# Patient Record
Sex: MALE | Race: WHITE | NOT HISPANIC OR LATINO | URBAN - METROPOLITAN AREA
[De-identification: names, ages, dates, MRNs, and addresses within clinical notes are randomized per-mention and may not be internally consistent; named-entity substitution may affect disease eponyms.]

---

## 2019-05-05 ENCOUNTER — INPATIENT (INPATIENT)
Facility: HOSPITAL | Age: 64
LOS: 1 days | Discharge: HOME | DRG: 505 | End: 2019-05-07
Attending: INTERNAL MEDICINE | Admitting: INTERNAL MEDICINE
Payer: COMMERCIAL

## 2019-05-05 VITALS
RESPIRATION RATE: 16 BRPM | TEMPERATURE: 98 F | WEIGHT: 235.01 LBS | DIASTOLIC BLOOD PRESSURE: 92 MMHG | HEIGHT: 72 IN | HEART RATE: 60 BPM | OXYGEN SATURATION: 97 % | SYSTOLIC BLOOD PRESSURE: 179 MMHG

## 2019-05-05 DIAGNOSIS — Z98.890 OTHER SPECIFIED POSTPROCEDURAL STATES: Chronic | ICD-10-CM

## 2019-05-05 DIAGNOSIS — Z98.1 ARTHRODESIS STATUS: Chronic | ICD-10-CM

## 2019-05-05 DIAGNOSIS — M86.432: ICD-10-CM

## 2019-05-05 DIAGNOSIS — M86.9 OSTEOMYELITIS, UNSPECIFIED: ICD-10-CM

## 2019-05-05 LAB
ALBUMIN SERPL ELPH-MCNC: 4 G/DL — SIGNIFICANT CHANGE UP (ref 3.3–5)
ALP SERPL-CCNC: 87 U/L — SIGNIFICANT CHANGE UP (ref 30–120)
ALT FLD-CCNC: 27 U/L DA — SIGNIFICANT CHANGE UP (ref 10–60)
ANION GAP SERPL CALC-SCNC: 10 MMOL/L — SIGNIFICANT CHANGE UP (ref 5–17)
APPEARANCE UR: CLEAR — SIGNIFICANT CHANGE UP
APTT BLD: 33.9 SEC — SIGNIFICANT CHANGE UP (ref 28.5–37)
AST SERPL-CCNC: 24 U/L — SIGNIFICANT CHANGE UP (ref 10–40)
BASOPHILS # BLD AUTO: 0.07 K/UL — SIGNIFICANT CHANGE UP (ref 0–0.2)
BASOPHILS NFR BLD AUTO: 0.8 % — SIGNIFICANT CHANGE UP (ref 0–2)
BILIRUB SERPL-MCNC: 0.5 MG/DL — SIGNIFICANT CHANGE UP (ref 0.2–1.2)
BILIRUB UR-MCNC: NEGATIVE — SIGNIFICANT CHANGE UP
BUN SERPL-MCNC: 22 MG/DL — SIGNIFICANT CHANGE UP (ref 7–23)
CALCIUM SERPL-MCNC: 9 MG/DL — SIGNIFICANT CHANGE UP (ref 8.4–10.5)
CHLORIDE SERPL-SCNC: 103 MMOL/L — SIGNIFICANT CHANGE UP (ref 96–108)
CO2 SERPL-SCNC: 27 MMOL/L — SIGNIFICANT CHANGE UP (ref 22–31)
COLOR SPEC: YELLOW — SIGNIFICANT CHANGE UP
CREAT SERPL-MCNC: 0.82 MG/DL — SIGNIFICANT CHANGE UP (ref 0.5–1.3)
DIFF PNL FLD: ABNORMAL
EOSINOPHIL # BLD AUTO: 0.09 K/UL — SIGNIFICANT CHANGE UP (ref 0–0.5)
EOSINOPHIL NFR BLD AUTO: 1 % — SIGNIFICANT CHANGE UP (ref 0–6)
ERYTHROCYTE [SEDIMENTATION RATE] IN BLOOD: 8 MM/HR — SIGNIFICANT CHANGE UP (ref 0–9)
GLUCOSE SERPL-MCNC: 96 MG/DL — SIGNIFICANT CHANGE UP (ref 70–99)
GLUCOSE UR QL: NEGATIVE MG/DL — SIGNIFICANT CHANGE UP
HCT VFR BLD CALC: 43.2 % — SIGNIFICANT CHANGE UP (ref 39–50)
HGB BLD-MCNC: 14.8 G/DL — SIGNIFICANT CHANGE UP (ref 13–17)
IMM GRANULOCYTES NFR BLD AUTO: 0.3 % — SIGNIFICANT CHANGE UP (ref 0–1.5)
INR BLD: 1.39 RATIO — HIGH (ref 0.88–1.16)
KETONES UR-MCNC: NEGATIVE — SIGNIFICANT CHANGE UP
LACTATE SERPL-SCNC: 0.8 MMOL/L — SIGNIFICANT CHANGE UP (ref 0.7–2)
LEUKOCYTE ESTERASE UR-ACNC: NEGATIVE — SIGNIFICANT CHANGE UP
LYMPHOCYTES # BLD AUTO: 1.45 K/UL — SIGNIFICANT CHANGE UP (ref 1–3.3)
LYMPHOCYTES # BLD AUTO: 15.8 % — SIGNIFICANT CHANGE UP (ref 13–44)
MCHC RBC-ENTMCNC: 30 PG — SIGNIFICANT CHANGE UP (ref 27–34)
MCHC RBC-ENTMCNC: 34.3 GM/DL — SIGNIFICANT CHANGE UP (ref 32–36)
MCV RBC AUTO: 87.6 FL — SIGNIFICANT CHANGE UP (ref 80–100)
MONOCYTES # BLD AUTO: 0.75 K/UL — SIGNIFICANT CHANGE UP (ref 0–0.9)
MONOCYTES NFR BLD AUTO: 8.2 % — SIGNIFICANT CHANGE UP (ref 2–14)
NEUTROPHILS # BLD AUTO: 6.77 K/UL — SIGNIFICANT CHANGE UP (ref 1.8–7.4)
NEUTROPHILS NFR BLD AUTO: 73.9 % — SIGNIFICANT CHANGE UP (ref 43–77)
NITRITE UR-MCNC: NEGATIVE — SIGNIFICANT CHANGE UP
NRBC # BLD: 0 /100 WBCS — SIGNIFICANT CHANGE UP (ref 0–0)
PH UR: 6.5 — SIGNIFICANT CHANGE UP (ref 5–8)
PLATELET # BLD AUTO: 306 K/UL — SIGNIFICANT CHANGE UP (ref 150–400)
POTASSIUM SERPL-MCNC: 3.8 MMOL/L — SIGNIFICANT CHANGE UP (ref 3.5–5.3)
POTASSIUM SERPL-SCNC: 3.8 MMOL/L — SIGNIFICANT CHANGE UP (ref 3.5–5.3)
PROT SERPL-MCNC: 7.6 G/DL — SIGNIFICANT CHANGE UP (ref 6–8.3)
PROT UR-MCNC: NEGATIVE MG/DL — SIGNIFICANT CHANGE UP
PROTHROM AB SERPL-ACNC: 15.3 SEC — HIGH (ref 10–12.9)
RBC # BLD: 4.93 M/UL — SIGNIFICANT CHANGE UP (ref 4.2–5.8)
RBC # FLD: 12.4 % — SIGNIFICANT CHANGE UP (ref 10.3–14.5)
RBC CASTS # UR COMP ASSIST: SIGNIFICANT CHANGE UP /HPF (ref 0–4)
SODIUM SERPL-SCNC: 140 MMOL/L — SIGNIFICANT CHANGE UP (ref 135–145)
SP GR SPEC: 1.01 — SIGNIFICANT CHANGE UP (ref 1.01–1.02)
UROBILINOGEN FLD QL: NEGATIVE MG/DL — SIGNIFICANT CHANGE UP
WBC # BLD: 9.16 K/UL — SIGNIFICANT CHANGE UP (ref 3.8–10.5)
WBC # FLD AUTO: 9.16 K/UL — SIGNIFICANT CHANGE UP (ref 3.8–10.5)
WBC UR QL: SIGNIFICANT CHANGE UP

## 2019-05-05 PROCEDURE — 71045 X-RAY EXAM CHEST 1 VIEW: CPT | Mod: 26

## 2019-05-05 PROCEDURE — 99223 1ST HOSP IP/OBS HIGH 75: CPT | Mod: AI

## 2019-05-05 PROCEDURE — 99285 EMERGENCY DEPT VISIT HI MDM: CPT

## 2019-05-05 PROCEDURE — 93010 ELECTROCARDIOGRAM REPORT: CPT

## 2019-05-05 PROCEDURE — 73630 X-RAY EXAM OF FOOT: CPT | Mod: 26,LT

## 2019-05-05 RX ORDER — METOPROLOL TARTRATE 50 MG
25 TABLET ORAL DAILY
Qty: 0 | Refills: 0 | Status: DISCONTINUED | OUTPATIENT
Start: 2019-05-05 | End: 2019-05-07

## 2019-05-05 RX ORDER — ATORVASTATIN CALCIUM 80 MG/1
1 TABLET, FILM COATED ORAL
Qty: 0 | Refills: 0 | COMMUNITY

## 2019-05-05 RX ORDER — SODIUM CHLORIDE 9 MG/ML
1000 INJECTION INTRAMUSCULAR; INTRAVENOUS; SUBCUTANEOUS
Qty: 0 | Refills: 0 | Status: DISCONTINUED | OUTPATIENT
Start: 2019-05-05 | End: 2019-05-07

## 2019-05-05 RX ORDER — PIPERACILLIN AND TAZOBACTAM 4; .5 G/20ML; G/20ML
3.38 INJECTION, POWDER, LYOPHILIZED, FOR SOLUTION INTRAVENOUS EVERY 8 HOURS
Qty: 0 | Refills: 0 | Status: DISCONTINUED | OUTPATIENT
Start: 2019-05-05 | End: 2019-05-06

## 2019-05-05 RX ORDER — LOSARTAN POTASSIUM 100 MG/1
100 TABLET, FILM COATED ORAL DAILY
Qty: 0 | Refills: 0 | Status: DISCONTINUED | OUTPATIENT
Start: 2019-05-05 | End: 2019-05-07

## 2019-05-05 RX ORDER — ENOXAPARIN SODIUM 100 MG/ML
40 INJECTION SUBCUTANEOUS DAILY
Qty: 0 | Refills: 0 | Status: DISCONTINUED | OUTPATIENT
Start: 2019-05-05 | End: 2019-05-07

## 2019-05-05 RX ORDER — PIPERACILLIN AND TAZOBACTAM 4; .5 G/20ML; G/20ML
3.38 INJECTION, POWDER, LYOPHILIZED, FOR SOLUTION INTRAVENOUS ONCE
Qty: 0 | Refills: 0 | Status: COMPLETED | OUTPATIENT
Start: 2019-05-05 | End: 2019-05-05

## 2019-05-05 RX ORDER — VANCOMYCIN HCL 1 G
1750 VIAL (EA) INTRAVENOUS EVERY 12 HOURS
Qty: 0 | Refills: 0 | Status: DISCONTINUED | OUTPATIENT
Start: 2019-05-05 | End: 2019-05-05

## 2019-05-05 RX ORDER — VANCOMYCIN HCL 1 G
1000 VIAL (EA) INTRAVENOUS ONCE
Qty: 0 | Refills: 0 | Status: COMPLETED | OUTPATIENT
Start: 2019-05-05 | End: 2019-05-05

## 2019-05-05 RX ORDER — LOSARTAN POTASSIUM 100 MG/1
1 TABLET, FILM COATED ORAL
Qty: 0 | Refills: 0 | COMMUNITY

## 2019-05-05 RX ORDER — VANCOMYCIN HCL 1 G
1500 VIAL (EA) INTRAVENOUS EVERY 12 HOURS
Qty: 0 | Refills: 0 | Status: DISCONTINUED | OUTPATIENT
Start: 2019-05-05 | End: 2019-05-06

## 2019-05-05 RX ORDER — ATORVASTATIN CALCIUM 80 MG/1
20 TABLET, FILM COATED ORAL AT BEDTIME
Qty: 0 | Refills: 0 | Status: DISCONTINUED | OUTPATIENT
Start: 2019-05-05 | End: 2019-05-07

## 2019-05-05 RX ORDER — METOPROLOL TARTRATE 50 MG
1 TABLET ORAL
Qty: 0 | Refills: 0 | COMMUNITY

## 2019-05-05 RX ORDER — HYDROCHLOROTHIAZIDE 25 MG
12.5 TABLET ORAL DAILY
Qty: 0 | Refills: 0 | Status: DISCONTINUED | OUTPATIENT
Start: 2019-05-05 | End: 2019-05-06

## 2019-05-05 RX ADMIN — SODIUM CHLORIDE 100 MILLILITER(S): 9 INJECTION INTRAMUSCULAR; INTRAVENOUS; SUBCUTANEOUS at 18:00

## 2019-05-05 RX ADMIN — PIPERACILLIN AND TAZOBACTAM 200 GRAM(S): 4; .5 INJECTION, POWDER, LYOPHILIZED, FOR SOLUTION INTRAVENOUS at 17:30

## 2019-05-05 RX ADMIN — SODIUM CHLORIDE 100 MILLILITER(S): 9 INJECTION INTRAMUSCULAR; INTRAVENOUS; SUBCUTANEOUS at 16:58

## 2019-05-05 RX ADMIN — ATORVASTATIN CALCIUM 20 MILLIGRAM(S): 80 TABLET, FILM COATED ORAL at 21:59

## 2019-05-05 RX ADMIN — Medication 250 MILLIGRAM(S): at 18:00

## 2019-05-05 NOTE — H&P ADULT - NSICDXPASTSURGICALHX_GEN_ALL_CORE_FT
PAST SURGICAL HISTORY:  H/O right knee surgery     History of ankle surgery     S/P cervical spinal fusion     S/P lumbar fusion

## 2019-05-05 NOTE — H&P ADULT - ASSESSMENT
Left 3rd toe osteomyelitis  Admit to medicine.  blood cultures.  IV zosyn/vanco  vanco trough level  podiatry . notified.  ID consult . notified by .  likely for OR for amputation on 5/7/19.    HTN  losartan/metoprolo/HCTZ with parameter.    Dyslipidemia  simvastatin.    obesity  weigh reduction.  Plan of care was discuss with patient, all questions were answered, seems understand and in agreement.  case d/w . 62 y/o male with PMHx of HTN, dyslipidemia, s/p cervical and lumbar  fusion, left ankle right knee and shoulder surgery  presents to the ED c/o worsening L third toe infection with discoloration/blackening of toe since few weeks. MRI 3 days ago at showed osteomyelitis. Pt sent by podiatrist Dr. Hewitt for IV abx. Plan for amputation on 5/7.  Nonsmoker. Denies fever, chills, headache, dizziness, chest pain.    Left 3rd toe osteomyelitis  Admit to medicine.  blood cultures.  IV zosyn/vanco  vanco trough level  podiatry . notified.  ID consult . notified by .  likely for OR for amputation on 5/7/19.    HTN  losartan/metoprolo/HCTZ with parameter.    Dyslipidemia  simvastatin.    obesity  weigh reduction.  Plan of care was discuss with patient, all questions were answered, seems understand and in agreement.  case d/w .

## 2019-05-05 NOTE — ED ADULT TRIAGE NOTE - CHIEF COMPLAINT QUOTE
" I have infection my middle toe ( left foot ). Im on antibiotic but it is not working" Pt sent in by Dr Ruelas

## 2019-05-05 NOTE — H&P ADULT - NSHPSOCIALHISTORY_GEN_ALL_CORE
SOCIAL HISTORY:  Smoker:  NO        PACK YEARS:                         WHEN QUIT?  ETOH use:   NO               FREQUENCY / QUANTITY:  [place] Drug use:  NO

## 2019-05-05 NOTE — CONSULT NOTE ADULT - SUBJECTIVE AND OBJECTIVE BOX
S : 63y year old Male seen at bedside for Left foot ulceration with MRI confirmed OM to the middle phalanx.  Patient relates to having the ulceration for  many weeks with increasing severity.  Patient has had previous treatments consisting of oral ABX and local wound care without resolution of his symtoms.    Patient states he was referred to the ED by myself for admission with administration of IV ABX and probable amputation of the left third digit.     Chief Complaint : Patient is a 63y old  Male who presents with a chief complaint of   HPI : HPI:      Patient admits to  (-) Fevers, (-) Chills, (-) Nausea, (-) Vomiting, (-) Shortness of Breath      PMH:   PSH:    Allergies:No Known Allergies      Labs:      WBC Trend      Chem              T(F): 97.6 (05-05-19 @ 15:45), Max: 97.6 (05-05-19 @ 15:45)  HR: 60 (05-05-19 @ 15:45) (60 - 60)  BP: 179/92 (05-05-19 @ 15:45) (179/92 - 179/92)  RR: 16 (05-05-19 @ 15:45) (16 - 16)  SpO2: 97% (05-05-19 @ 15:45) (97% - 97%)  Wt(kg): --    O:   General: Pleasant  male NAD & AOX3.    Integument:  Skin warm, dry and supple bilateral.    Ulceration left  third digit , + hyperkeratotic border, wound base Granular , wound size (1.5 cm X 1.5 cm X.5cm) + edema, + claudy-wound erythema, - purulence, + fluctuance, + tracking/tunneling, + probes to bone.   Vascular: Dorsalis Pedis and Posterior Tibial pulses 2/4.  Capillary re-fill time less then 1 seconds digits 1-5 bilateral.    Neuro: Protective sensation intact to the level of the digits bilateral.  MSK: Muscle strength 5/5 all major muscle groups bilateral.  Deformity:  A: Left foot ulceration with OM confirmed by MRI      P:   Chart reviewed and Patient evaluated  Discussed diagnosis and treatment with patient and patient's wife  Wound flush with normal saline  Obtained wound culture to be sent to Pathology  Excisional debridement  on non viable soft  tissue   down to subcutaneous tissue red granular base  Left foot ulceration  Applied  dry sterile dressing  X-rays reviewed : Shows positive signs of OM distal and middle phalanx  MRI: reviewed and confirmed OM left third digit distal and middle phalanx  Continue with IV antibiotics As Per ID  Weight bearing as tolerated with surgical shoe left   Offloading to bilateral Heels.   Discussed importance of daily foot examinations and proper shoe gear   Podiatry will follow while in house.   will discuss care plan  with all  Attendings

## 2019-05-05 NOTE — ED PROVIDER NOTE - SKIN, MLM
+L 3rd toe with black eschar on tip. all toenails severely infected with fungus. distal. pulses and sensation intact.

## 2019-05-05 NOTE — H&P ADULT - NSICDXPASTMEDICALHX_GEN_ALL_CORE_FT
PAST MEDICAL HISTORY:  HTN (hypertension) PAST MEDICAL HISTORY:  Dyslipidemia     HTN (hypertension)

## 2019-05-05 NOTE — H&P ADULT - HISTORY OF PRESENT ILLNESS
62 y/o male with PMHx of HTN, dyslipidemia, s/p cervical and lumbar  fusion, left ankle right knee and shoulder surgery  presents to the ED c/o worsening L third toe infection with discoloration/blackening of toe since few weeks. MRI 3 days ago at showed osteomyelitis. Pt sent by podiatrist Dr. Hewitt for IV abx. Plan for amputation on 5/7.  Nonsmoker. Denies fever, chills, headache, dizziness, chest pain. 62 y/o male with PMHx of HTN, dyslipidemia, s/p cervical and lumbar  fusion, left ankle right knee and shoulder surgery  presents to the ED c/o worsening L third toe infection with discoloration/blackening of toe since few weeks. MRI 3 days ago  showed osteomyelitis. Pt sent by podiatrist Dr. Hewitt for IV abx. Plan for amputation on 5/7.  Nonsmoker. Denies fever, chills, headache, dizziness, chest pain.

## 2019-05-05 NOTE — ED PROVIDER NOTE - CARDIAC, MLM
Normal rate, regular rhythm.  Heart sounds S1, S2.  No murmurs, rubs or gallops. bradycardic, regular rhythm.  Heart sounds S1, S2.  No murmurs, rubs or gallops.

## 2019-05-05 NOTE — CONSULT NOTE ADULT - PROBLEM SELECTOR RECOMMENDATION 9
local wound care left third digit  IV ABX as per ID  ID consult ordered  xrays ordered  MRI not required - done on the outside confirming OM left third digit  Pt will be scheduled for amputation of the left third digit for Tuesday May 7  hospitalist to medically optimize pt for planned surgical procedures  will follow and discuss with all attendings

## 2019-05-05 NOTE — ED PROVIDER NOTE - OBJECTIVE STATEMENT
62 y/o male with PMHx of HTN, s/p cervical fusion presents to the ED c/o worsening L third toe infection with discoloration/blackening of toe. MRI 3 days ago at showed osteomyelitis. Pt sent by podiatrist Dr. Hewitt for IV abx. Plan for amputation next week. PCP/Dr. Bassett. Nonsmoker

## 2019-05-06 ENCOUNTER — TRANSCRIPTION ENCOUNTER (OUTPATIENT)
Age: 64
End: 2019-05-06

## 2019-05-06 DIAGNOSIS — M86.472 CHRONIC OSTEOMYELITIS WITH DRAINING SINUS, LEFT ANKLE AND FOOT: ICD-10-CM

## 2019-05-06 LAB
HCV AB S/CO SERPL IA: 0.12 S/CO — SIGNIFICANT CHANGE UP (ref 0–0.99)
HCV AB SERPL-IMP: SIGNIFICANT CHANGE UP

## 2019-05-06 PROCEDURE — 93010 ELECTROCARDIOGRAM REPORT: CPT

## 2019-05-06 PROCEDURE — 99233 SBSQ HOSP IP/OBS HIGH 50: CPT

## 2019-05-06 RX ORDER — HYDROCHLOROTHIAZIDE 25 MG
12.5 TABLET ORAL ONCE
Qty: 0 | Refills: 0 | Status: COMPLETED | OUTPATIENT
Start: 2019-05-06 | End: 2019-05-06

## 2019-05-06 RX ORDER — LANOLIN ALCOHOL/MO/W.PET/CERES
3 CREAM (GRAM) TOPICAL AT BEDTIME
Qty: 0 | Refills: 0 | Status: DISCONTINUED | OUTPATIENT
Start: 2019-05-06 | End: 2019-05-07

## 2019-05-06 RX ORDER — ALPRAZOLAM 0.25 MG
0.25 TABLET ORAL ONCE
Qty: 0 | Refills: 0 | Status: DISCONTINUED | OUTPATIENT
Start: 2019-05-06 | End: 2019-05-06

## 2019-05-06 RX ORDER — HYDROCHLOROTHIAZIDE 25 MG
25 TABLET ORAL DAILY
Qty: 0 | Refills: 0 | Status: DISCONTINUED | OUTPATIENT
Start: 2019-05-07 | End: 2019-05-07

## 2019-05-06 RX ADMIN — Medication 0.25 MILLIGRAM(S): at 02:46

## 2019-05-06 RX ADMIN — SODIUM CHLORIDE 100 MILLILITER(S): 9 INJECTION INTRAMUSCULAR; INTRAVENOUS; SUBCUTANEOUS at 12:48

## 2019-05-06 RX ADMIN — Medication 12.5 MILLIGRAM(S): at 05:41

## 2019-05-06 RX ADMIN — PIPERACILLIN AND TAZOBACTAM 25 GRAM(S): 4; .5 INJECTION, POWDER, LYOPHILIZED, FOR SOLUTION INTRAVENOUS at 00:07

## 2019-05-06 RX ADMIN — Medication 300 MILLIGRAM(S): at 05:41

## 2019-05-06 RX ADMIN — ATORVASTATIN CALCIUM 20 MILLIGRAM(S): 80 TABLET, FILM COATED ORAL at 21:28

## 2019-05-06 RX ADMIN — Medication 3 MILLIGRAM(S): at 21:28

## 2019-05-06 RX ADMIN — ENOXAPARIN SODIUM 40 MILLIGRAM(S): 100 INJECTION SUBCUTANEOUS at 11:32

## 2019-05-06 RX ADMIN — LOSARTAN POTASSIUM 100 MILLIGRAM(S): 100 TABLET, FILM COATED ORAL at 05:41

## 2019-05-06 RX ADMIN — PIPERACILLIN AND TAZOBACTAM 25 GRAM(S): 4; .5 INJECTION, POWDER, LYOPHILIZED, FOR SOLUTION INTRAVENOUS at 09:15

## 2019-05-06 RX ADMIN — Medication 12.5 MILLIGRAM(S): at 13:00

## 2019-05-06 RX ADMIN — SODIUM CHLORIDE 100 MILLILITER(S): 9 INJECTION INTRAMUSCULAR; INTRAVENOUS; SUBCUTANEOUS at 00:07

## 2019-05-06 NOTE — PROGRESS NOTE ADULT - SUBJECTIVE AND OBJECTIVE BOX
Pt seen and examined. Reports that his toe swelling and pain has been improving since admission. Denies fevers, chills, or any other symptoms on ROS.     MEDICATIONS  (STANDING):  atorvastatin 20 milliGRAM(s) Oral at bedtime  enoxaparin Injectable 40 milliGRAM(s) SubCutaneous daily  hydrochlorothiazide 12.5 milliGRAM(s) Oral once  losartan 100 milliGRAM(s) Oral daily  metoprolol succinate ER 25 milliGRAM(s) Oral daily  piperacillin/tazobactam IVPB. 3.375 Gram(s) IV Intermittent every 8 hours  sodium chloride 0.9%. 1000 milliLiter(s) (100 mL/Hr) IV Continuous <Continuous>  vancomycin  IVPB 1500 milliGRAM(s) IV Intermittent every 12 hours    MEDICATIONS  (PRN):    Allergies    No Known Allergies    Intolerances    REVIEW OF SYSTEMS:  CONSTITUTIONAL: No fever, weight loss, or fatigue  EYES: No eye pain, visual disturbances, or discharge  ENMT:  No difficulty hearing, tinnitus, vertigo; No sinus or throat pain  NECK: No pain or stiffness  BREASTS: No pain, masses, or nipple discharge  RESPIRATORY: No cough, wheezing, chills or hemoptysis; No shortness of breath  CARDIOVASCULAR: No chest pain, palpitations, dizziness, or leg swelling  GASTROINTESTINAL: No abdominal or epigastric pain. No nausea, vomiting, or hematemesis; No diarrhea or constipation. No melena or hematochezia.  GENITOURINARY: No dysuria, frequency, hematuria, or incontinence  NEUROLOGICAL: No headaches, memory loss, loss of strength, numbness, or tremors  SKIN: No itching, burning, rashes, or lesions   LYMPH NODES: No enlarged glands  ENDOCRINE: No heat or cold intolerance; No hair loss; No polydipsia or polyuria  MUSCULOSKELETAL: L 3rd toe swelling and eschar and pain.   PSYCHIATRIC: No depression, anxiety, mood swings, or difficulty sleeping  HEME/LYMPH: No easy bruising, or bleeding gums  ALLERGY AND IMMUNOLOGIC: No hives or eczema    Vital Signs Last 24 Hrs  T(C): 36.5 (06 May 2019 08:55), Max: 36.6 (05 May 2019 18:17)  T(F): 97.7 (06 May 2019 08:55), Max: 97.9 (06 May 2019 00:25)  HR: 61 (06 May 2019 08:55) (46 - 62)  BP: 164/90 (06 May 2019 08:55) (127/81 - 179/92)  BP(mean): --  RR: 14 (06 May 2019 08:55) (14 - 17)  SpO2: 98% (06 May 2019 08:55) (96% - 98%)        PHYSICAL EXAM:  Vital Signs Last 24 Hrs  T(C): 36.5 (06 May 2019 08:55), Max: 36.6 (05 May 2019 18:17)  T(F): 97.7 (06 May 2019 08:55), Max: 97.9 (06 May 2019 00:25)  HR: 61 (06 May 2019 08:55) (46 - 62)  BP: 164/90 (06 May 2019 08:55) (127/81 - 179/92)  BP(mean): --  RR: 14 (06 May 2019 08:55) (14 - 17)  SpO2: 98% (06 May 2019 08:55) (96% - 98%)    GENERAL: NAD, well-groomed, well-developed  HEAD:  Atraumatic, Normocephalic  EYES: EOMI, PERRLA, conjunctiva and sclera clear  ENMT: No tonsillar erythema, exudates, or enlargement; Moist mucous membranes, Good dentition, No lesions  NECK: Supple, No JVD, Normal thyroid  NERVOUS SYSTEM:  Alert & Oriented X3, Good concentration; Motor Strength 5/5 B/L upper and lower extremities; CHEST/LUNG: Clear to auscultation bilaterally; No rales, rhonchi, wheezing, or rubs  HEART: Regular rate and rhythm; No murmurs, rubs, or gallops  ABDOMEN: Soft, Nontender, Nondistended; Bowel sounds present  EXTREMITIES:  2+ Peripheral Pulses, No clubbing, cyanosis, or edema, L 3rd toe + eschar at the tip, swelling and pain  LYMPH: No lymphadenopathy noted  SKIN: No rashes or lesions    Labs:                          14.8   9.16  )-----------( 306      ( 05 May 2019 17:11 )             43.2           140  |  103  |  22  ----------------------------<  96  3.8   |  27  |  0.82    Ca    9.0      05 May 2019 17:11    TPro  7.6  /  Alb  4.0  /  TBili  0.5  /  DBili  x   /  AST  24  /  ALT  27  /  AlkPhos  87                Urinalysis Basic - ( 05 May 2019 17:11 )    Color: Yellow / Appearance: Clear / S.015 / pH: x  Gluc: x / Ketone: Negative  / Bili: Negative / Urobili: Negative mg/dL   Blood: x / Protein: Negative mg/dL / Nitrite: Negative   Leuk Esterase: Negative / RBC: 0-2 /HPF / WBC 0-2   Sq Epi: x / Non Sq Epi: x / Bacteria: x        PT/INR - ( 05 May 2019 17:11 )   PT: 15.3 sec;   INR: 1.39 ratio         PTT - ( 05 May 2019 17:11 )  PTT:33.9 sec    Lactate Trend   @ 17:11 Lactate:0.8             CAPILLARY BLOOD GLUCOSE          EKG:   Personally Reviewed:  [ x] YES     Imaging:   Personally Reviewed:  [x ] YES

## 2019-05-06 NOTE — PROGRESS NOTE ADULT - PROBLEM SELECTOR PLAN 1
cont local wound care  cont IV ABX as per ID  hospitalist to consider sleeping pill this evening if clinically warranted  hospitalist to medically optimize pt for surgical procedure tomorrow  Pt is scheduled for an amputation of the left third digit with flap coverage tomorrow May 7, 2019  will cont to follow and discuss with all attendings

## 2019-05-06 NOTE — PROGRESS NOTE ADULT - SUBJECTIVE AND OBJECTIVE BOX
PROGRESS NOTE   Patient is a 63y old  Male who presents with a chief complaint of OM of left 3rd toe. (05 May 2019 18:04)  Pt seen at bedside resting comfortable in NAD. Pt had a hard time sleeping. will discuss with hospitalist about possible sleeping aid this evening. Pt in good spirits and denies any chest pain, calf pain or SOB. Pt had an opportunity to have all questions asked and answered. Pt is in agreement with the proposed care plan.       HPI:  64 y/o male with PMHx of HTN, dyslipidemia, s/p cervical and lumbar  fusion, left ankle right knee and shoulder surgery  presents to the ED c/o worsening L third toe infection with discoloration/blackening of toe since few weeks. MRI 3 days ago  showed osteomyelitis. Pt sent by podiatrist Dr. Hewitt for IV abx. Plan for amputation on 5/7.  Nonsmoker. Denies fever, chills, headache, dizziness, chest pain. (05 May 2019 18:04)      Vital Signs Last 24 Hrs  T(C): 36.4 (06 May 2019 05:03), Max: 36.6 (05 May 2019 18:17)  T(F): 97.5 (06 May 2019 05:03), Max: 97.9 (06 May 2019 00:25)  HR: 46 (06 May 2019 05:03) (46 - 62)  BP: 127/81 (06 May 2019 05:03) (127/81 - 179/92)  BP(mean): --  RR: 16 (06 May 2019 05:03) (14 - 17)  SpO2: 97% (06 May 2019 05:03) (96% - 97%)                          14.8   9.16  )-----------( 306      ( 05 May 2019 17:11 )             43.2               05-05    140  |  103  |  22  ----------------------------<  96  3.8   |  27  |  0.82    Ca    9.0      05 May 2019 17:11    TPro  7.6  /  Alb  4.0  /  TBili  0.5  /  DBili  x   /  AST  24  /  ALT  27  /  AlkPhos  87  05-05      PHYSICAL EXAM  GEN: RANDY CANALES is a pleasant well-nourished, well developed 63y Male in no acute distress, alert awake, and oriented to person, place and time.   LE Focused:  noted is an ulceration about the distal tip of the left third digit. There is decreased erythema, edema and calor noted. The digit is swollen consistent with underlying OM of the distal phalanx and middle phalanx of the left third digit. The digit is improved from previous levels and is responding favorable to the current care plan.

## 2019-05-06 NOTE — PROGRESS NOTE ADULT - ASSESSMENT
62 y/o male with PMHx of HTN, dyslipidemia, s/p cervical and lumbar  fusion, left ankle right knee and shoulder surgery  presents to the ED c/o worsening L third toe infection with discoloration/blackening of toe since few weeks. MRI 3 days ago at showed osteomyelitis. Pt sent by podiatrist Dr. Hewitt for IV abx. Plan for amputation on 5/7.  Nonsmoker and nonalcoholic. Denies fever, chills, headache, dizziness, chest pain.    1. Left 3rd toe osteomyelitis    F/U on blood cultures.  IV zosyn/vanco  vanco trough level, should be drawn before dose tonight.   Podiatry following, plan for OR on 5/7.   F/U ID consult . notified by .    Pre-op Eval for L 3rd toe amputation:     Pt with history of HTN, takes Losartan, Hydrochlotothiazide, and Metoprolol. He has Asymptomatic bradycardia and usually tolerates activities such as climbing stairs and walking.   I have increased Hydrochlorothiazide dose for better control of BP. Monitor BP.   Ordered a repeat EKG as we have no baseline and previous EKG is nonspecific ST changes with sinus bradycardia.   CXR shows no active disease.   NPO tonight and labs in AM.   No history of diabetes.   If repeat EKG is negative for acute changes, patient is at an acceptable risk for the planned procedure tomorrow.     HTN  losartan/metoprolo/HCTZ with parameter.  Increased HCTZ for better BP control.     Dyslipidemia  simvastatin.    obesity  weigh reduction.  Plan of care was discuss with patient, all questions were answered, seems understand and in agreement.

## 2019-05-06 NOTE — CONSULT NOTE ADULT - SUBJECTIVE AND OBJECTIVE BOX
HPI:  64YO M with PMHx of HTN, dyslipidemia, s/p cervical and lumbar fusion, left ankle right knee and shoulder surgery who presented to the hospital with CC of Left third toe infection with discoloration/blackening of toe since few weeks. He was seen by Dr Hewitt outpatient who did an MRI which showed acute osteomyelitis. Pt sent to the hospital by Dr. Hewitt for IV abx and he is scheduled for partial toe amputation on 5/7. Denies fever, chills, headache, dizziness, chest pain. No n/v/d CP SOB.    Infectious Disease consult was called to evaluate pt and for antibiotic management.    Past Medical & Surgical Hx:  PAST MEDICAL & SURGICAL HISTORY:  Dyslipidemia  HTN (hypertension)  H/O right knee surgery  History of ankle surgery  S/P lumbar fusion  S/P cervical spinal fusion    Social History--  EtOH: denies   Tobacco: denies   Drug Use: denies    FAMILY HISTORY:  No pertinent family history in first degree relatives    Allergies  No Known Allergies    Home Medications:  atorvastatin 20 mg oral tablet: 1 tab(s) orally once a day (05 May 2019 17:56)  Augmentin 875 mg-125 mg oral tablet: 1 tab(s) orally every 12 hours (05 May 2019 17:56)  Cipro 500 mg oral tablet: 1 tab(s) orally every 12 hours (05 May 2019 17:56)  hydroCHLOROthiazide 12.5 mg oral capsule: 1 cap(s) orally once a day (05 May 2019 17:56)  losartan 100 mg oral tablet: 1 tab(s) orally once a day (05 May 2019 17:56)  metoprolol succinate 25 mg oral tablet, extended release: 1 tab(s) orally once a day (05 May 2019 17:56)    Current Inpatient Medications :    ANTIBIOTICS:   piperacillin/tazobactam IVPB. 3.375 Gram(s) IV Intermittent every 8 hours  vancomycin  IVPB 1500 milliGRAM(s) IV Intermittent every 12 hours    OTHER RELEVANT MEDICATIONS :  atorvastatin 20 milliGRAM(s) Oral at bedtime  enoxaparin Injectable 40 milliGRAM(s) SubCutaneous daily  hydrochlorothiazide 12.5 milliGRAM(s) Oral once  losartan 100 milliGRAM(s) Oral daily  metoprolol succinate ER 25 milliGRAM(s) Oral daily  sodium chloride 0.9%. 1000 milliLiter(s) IV Continuous <Continuous>    ROS:  CONSTITUTIONAL:  Negative fever or chills  EYES:  Negative  blurry vision or double vision  CARDIOVASCULAR:  Negative for chest pain or palpitations  RESPIRATORY:  Negative for cough, wheezing, or SOB   GASTROINTESTINAL:  Negative for nausea, vomiting, diarrhea, constipation, or abdominal pain  GENITOURINARY:  Negative frequency, urgency , dysuria or hematuria   NEUROLOGIC:  No headache, confusion, dizziness, lightheadedness  All other systems were reviewed and are negative    Physical Exam:  Vital Signs Last 24 Hrs  T(C): 36.5 (06 May 2019 08:55), Max: 36.6 (05 May 2019 18:17)  T(F): 97.7 (06 May 2019 08:55), Max: 97.9 (06 May 2019 00:25)  HR: 61 (06 May 2019 08:55) (46 - 62)  BP: 164/90 (06 May 2019 08:55) (127/81 - 179/92)  RR: 14 (06 May 2019 08:55) (14 - 17)  SpO2: 98% (06 May 2019 08:55) (96% - 98%)  Height (cm): 182.88 (05-05 @ 15:45)  Weight (kg): 106.6 (05-05 @ 15:45)  BMI (kg/m2): 31.9 (05-05 @ 15:45)  BSA (m2): 2.28 (05-05 @ 15:45)    General: well developed well nourished, in no acute distress  Eyes: sclera anicteric, pupils equal and reactive to light  ENMT: buccal mucosa moist, pharynx not injected  Neck: supple, trachea midline  Lungs: clear, no wheeze/rhonchi  Cardiovascular: regular rate and rhythm, S1 S2  Abdomen: soft, nontender, no organomegaly present, bowel sounds normal  Neurological:  alert and oriented x3, Cranial Nerves II-XII grossly intact  Skin:no increased ecchymosis/petechiae/purpura  Lymph Nodes: no palpable cervical/supraclavicular lymph nodes enlargements  Extremities: Left 3rd toe eschar with swelling  Left 2nd toe with callous  Pedal pulses palpable    Labs:                        14.8   9.16  )-----------( 306      ( 05 May 2019 17:11 )             43.2   05-05    140  |  103  |  22  ----------------------------<  96  3.8   |  27  |  0.82    Ca    9.0      05 May 2019 17:11    TPro  7.6  /  Alb  4.0  /  TBili  0.5  /  DBili  x   /  AST  24  /  ALT  27  /  AlkPhos  87  05-05    RECENT CULTURES:  pending    RADIOLOGY & ADDITIONAL STUDIES:    EXAM:  FOOT COMPLETE LEFT (MIN 3 VIEWS)                          PROCEDURE DATE:  05/05/2019      INTERPRETATION:  Clinical history: 63-year-old male, osteomyelitis third   toe.    Three views without comparison were obtained, lucency in the tuft of the   third digit with a 3 mm lucency at the tip noted, findings are suspicious   for cellulitis/osteomyelitis. Remaining osseous and soft tissues intact.    Impression    Cortical and soft tissue lucency in the distal third digit suspicious for   cellulitis/osteomyelitis. Follow-up MRI can be ordered as clinically   indicated    Assessment :   64YO M with PMHx of HTN, dyslipidemia, s/p cervical and lumbar fusion, left ankle right knee and shoulder surgery admitted with Left third toe osteomyelitis     Plan :   Hold off on antibiotic until after surgery  Start Vancomycin post op  NO need for Zosyn  Fu OR culture and path  Trend temps and wbc    D/w Hospitalist    Continue with present regime .  Approptiate use of antibiotics and adverse effects reviewed.      I have discussed the above plan of care with patient/family in detail. They expressed understanding of the treatment plan . Risks, benefits and alternatives discussed in detail. I have asked if they have any questions or concerns and appropriately addressed them to the best of my ability .      > 45 minutes spent in direct patient care reviewing  the notes, lab data/ imaging , discussion with multidisciplinary team. All questions were addressed and answered to the best of my capacity .    Thank you for allowing me to participate in the care of your patient .      Paola Marrero MD  Infectious Disease  [phone]

## 2019-05-06 NOTE — DIETITIAN INITIAL EVALUATION ADULT. - NS AS NUTRI INTERV MEALS SNACK
General/healthful diet/DASH/TLC diet, enc protein intake/Fat - modified diet/Mineral - modified diet

## 2019-05-06 NOTE — DIETITIAN INITIAL EVALUATION ADULT. - OTHER INFO
64 yo male admitted from home c OM of L 3rd toe and planned amputation.  Pt currently on DASH/TLC diet and tolerating well, briefly reviewed heart healthy diet secondary to hx htn, dyslipidemia.  Pt to be NPO after midnight for pending sx.  Reviewed the importance of adequate protein and kcal intake after amputation to help promote wound healing, pt verbalized understanding.  Meal changes to be obtained daily to optimize po intake.

## 2019-05-07 ENCOUNTER — TRANSCRIPTION ENCOUNTER (OUTPATIENT)
Age: 64
End: 2019-05-07

## 2019-05-07 VITALS
HEART RATE: 105 BPM | RESPIRATION RATE: 16 BRPM | SYSTOLIC BLOOD PRESSURE: 158 MMHG | TEMPERATURE: 98 F | DIASTOLIC BLOOD PRESSURE: 95 MMHG | OXYGEN SATURATION: 100 %

## 2019-05-07 LAB
ANION GAP SERPL CALC-SCNC: 5 MMOL/L — SIGNIFICANT CHANGE UP (ref 5–17)
BUN SERPL-MCNC: 13 MG/DL — SIGNIFICANT CHANGE UP (ref 7–23)
CALCIUM SERPL-MCNC: 9.2 MG/DL — SIGNIFICANT CHANGE UP (ref 8.4–10.5)
CHLORIDE SERPL-SCNC: 104 MMOL/L — SIGNIFICANT CHANGE UP (ref 96–108)
CO2 SERPL-SCNC: 32 MMOL/L — HIGH (ref 22–31)
CREAT SERPL-MCNC: 0.82 MG/DL — SIGNIFICANT CHANGE UP (ref 0.5–1.3)
GLUCOSE SERPL-MCNC: 98 MG/DL — SIGNIFICANT CHANGE UP (ref 70–99)
GRAM STN FLD: SIGNIFICANT CHANGE UP
HCT VFR BLD CALC: 46 % — SIGNIFICANT CHANGE UP (ref 39–50)
HGB BLD-MCNC: 15.5 G/DL — SIGNIFICANT CHANGE UP (ref 13–17)
MCHC RBC-ENTMCNC: 30 PG — SIGNIFICANT CHANGE UP (ref 27–34)
MCHC RBC-ENTMCNC: 33.7 GM/DL — SIGNIFICANT CHANGE UP (ref 32–36)
MCV RBC AUTO: 89.1 FL — SIGNIFICANT CHANGE UP (ref 80–100)
NIGHT BLUE STAIN TISS: SIGNIFICANT CHANGE UP
NRBC # BLD: 0 /100 WBCS — SIGNIFICANT CHANGE UP (ref 0–0)
PLATELET # BLD AUTO: 288 K/UL — SIGNIFICANT CHANGE UP (ref 150–400)
POTASSIUM SERPL-MCNC: 3.4 MMOL/L — LOW (ref 3.5–5.3)
POTASSIUM SERPL-SCNC: 3.4 MMOL/L — LOW (ref 3.5–5.3)
RBC # BLD: 5.16 M/UL — SIGNIFICANT CHANGE UP (ref 4.2–5.8)
RBC # FLD: 12.3 % — SIGNIFICANT CHANGE UP (ref 10.3–14.5)
SODIUM SERPL-SCNC: 141 MMOL/L — SIGNIFICANT CHANGE UP (ref 135–145)
SPECIMEN SOURCE: SIGNIFICANT CHANGE UP
VANCOMYCIN TROUGH SERPL-MCNC: 2.5 UG/ML — LOW (ref 10–20)
WBC # BLD: 8.48 K/UL — SIGNIFICANT CHANGE UP (ref 3.8–10.5)
WBC # FLD AUTO: 8.48 K/UL — SIGNIFICANT CHANGE UP (ref 3.8–10.5)

## 2019-05-07 PROCEDURE — 88305 TISSUE EXAM BY PATHOLOGIST: CPT | Mod: 26

## 2019-05-07 PROCEDURE — 85730 THROMBOPLASTIN TIME PARTIAL: CPT

## 2019-05-07 PROCEDURE — 93005 ELECTROCARDIOGRAM TRACING: CPT

## 2019-05-07 PROCEDURE — 85610 PROTHROMBIN TIME: CPT

## 2019-05-07 PROCEDURE — 87070 CULTURE OTHR SPECIMN AEROBIC: CPT

## 2019-05-07 PROCEDURE — 80053 COMPREHEN METABOLIC PANEL: CPT

## 2019-05-07 PROCEDURE — 86803 HEPATITIS C AB TEST: CPT

## 2019-05-07 PROCEDURE — 99239 HOSP IP/OBS DSCHRG MGMT >30: CPT

## 2019-05-07 PROCEDURE — 83605 ASSAY OF LACTIC ACID: CPT

## 2019-05-07 PROCEDURE — 87116 MYCOBACTERIA CULTURE: CPT

## 2019-05-07 PROCEDURE — 87075 CULTR BACTERIA EXCEPT BLOOD: CPT

## 2019-05-07 PROCEDURE — 96374 THER/PROPH/DIAG INJ IV PUSH: CPT

## 2019-05-07 PROCEDURE — 73630 X-RAY EXAM OF FOOT: CPT

## 2019-05-07 PROCEDURE — 85652 RBC SED RATE AUTOMATED: CPT

## 2019-05-07 PROCEDURE — 99285 EMERGENCY DEPT VISIT HI MDM: CPT | Mod: 25

## 2019-05-07 PROCEDURE — 85027 COMPLETE CBC AUTOMATED: CPT

## 2019-05-07 PROCEDURE — 87102 FUNGUS ISOLATION CULTURE: CPT

## 2019-05-07 PROCEDURE — 81001 URINALYSIS AUTO W/SCOPE: CPT

## 2019-05-07 PROCEDURE — 87015 SPECIMEN INFECT AGNT CONCNTJ: CPT

## 2019-05-07 PROCEDURE — 87186 SC STD MICRODIL/AGAR DIL: CPT

## 2019-05-07 PROCEDURE — 80202 ASSAY OF VANCOMYCIN: CPT

## 2019-05-07 PROCEDURE — 88305 TISSUE EXAM BY PATHOLOGIST: CPT

## 2019-05-07 PROCEDURE — 71045 X-RAY EXAM CHEST 1 VIEW: CPT

## 2019-05-07 PROCEDURE — 73630 X-RAY EXAM OF FOOT: CPT | Mod: 26,LT

## 2019-05-07 PROCEDURE — 87206 SMEAR FLUORESCENT/ACID STAI: CPT

## 2019-05-07 PROCEDURE — 97161 PT EVAL LOW COMPLEX 20 MIN: CPT

## 2019-05-07 PROCEDURE — 36415 COLL VENOUS BLD VENIPUNCTURE: CPT

## 2019-05-07 PROCEDURE — 80048 BASIC METABOLIC PNL TOTAL CA: CPT

## 2019-05-07 PROCEDURE — 87040 BLOOD CULTURE FOR BACTERIA: CPT

## 2019-05-07 RX ORDER — METOPROLOL TARTRATE 50 MG
25 TABLET ORAL DAILY
Qty: 0 | Refills: 0 | Status: DISCONTINUED | OUTPATIENT
Start: 2019-05-07 | End: 2019-05-07

## 2019-05-07 RX ORDER — MOXIFLOXACIN HYDROCHLORIDE TABLETS, 400 MG 400 MG/1
1 TABLET, FILM COATED ORAL
Qty: 0 | Refills: 0 | COMMUNITY

## 2019-05-07 RX ORDER — POTASSIUM CHLORIDE 20 MEQ
40 PACKET (EA) ORAL ONCE
Qty: 0 | Refills: 0 | Status: COMPLETED | OUTPATIENT
Start: 2019-05-07 | End: 2019-05-07

## 2019-05-07 RX ORDER — SODIUM CHLORIDE 9 MG/ML
1000 INJECTION, SOLUTION INTRAVENOUS
Qty: 0 | Refills: 0 | Status: DISCONTINUED | OUTPATIENT
Start: 2019-05-07 | End: 2019-05-07

## 2019-05-07 RX ORDER — LANOLIN ALCOHOL/MO/W.PET/CERES
1 CREAM (GRAM) TOPICAL
Qty: 0 | Refills: 0 | COMMUNITY
Start: 2019-05-07

## 2019-05-07 RX ORDER — ENOXAPARIN SODIUM 100 MG/ML
40 INJECTION SUBCUTANEOUS DAILY
Qty: 0 | Refills: 0 | Status: DISCONTINUED | OUTPATIENT
Start: 2019-05-07 | End: 2019-05-07

## 2019-05-07 RX ORDER — LOSARTAN POTASSIUM 100 MG/1
100 TABLET, FILM COATED ORAL DAILY
Qty: 0 | Refills: 0 | Status: DISCONTINUED | OUTPATIENT
Start: 2019-05-07 | End: 2019-05-07

## 2019-05-07 RX ORDER — OXYCODONE HYDROCHLORIDE 5 MG/1
5 TABLET ORAL EVERY 6 HOURS
Qty: 0 | Refills: 0 | Status: DISCONTINUED | OUTPATIENT
Start: 2019-05-07 | End: 2019-05-07

## 2019-05-07 RX ORDER — ACETAMINOPHEN 500 MG
650 TABLET ORAL EVERY 6 HOURS
Qty: 0 | Refills: 0 | Status: DISCONTINUED | OUTPATIENT
Start: 2019-05-07 | End: 2019-05-07

## 2019-05-07 RX ORDER — CEFAZOLIN SODIUM 1 G
2000 VIAL (EA) INJECTION ONCE
Qty: 0 | Refills: 0 | Status: COMPLETED | OUTPATIENT
Start: 2019-05-07 | End: 2019-05-07

## 2019-05-07 RX ORDER — ATORVASTATIN CALCIUM 80 MG/1
20 TABLET, FILM COATED ORAL AT BEDTIME
Qty: 0 | Refills: 0 | Status: DISCONTINUED | OUTPATIENT
Start: 2019-05-07 | End: 2019-05-07

## 2019-05-07 RX ORDER — LANOLIN ALCOHOL/MO/W.PET/CERES
3 CREAM (GRAM) TOPICAL AT BEDTIME
Qty: 0 | Refills: 0 | Status: DISCONTINUED | OUTPATIENT
Start: 2019-05-07 | End: 2019-05-07

## 2019-05-07 RX ORDER — HYDROMORPHONE HYDROCHLORIDE 2 MG/ML
0.5 INJECTION INTRAMUSCULAR; INTRAVENOUS; SUBCUTANEOUS
Qty: 0 | Refills: 0 | Status: DISCONTINUED | OUTPATIENT
Start: 2019-05-07 | End: 2019-05-07

## 2019-05-07 RX ADMIN — Medication 650 MILLIGRAM(S): at 14:20

## 2019-05-07 RX ADMIN — LOSARTAN POTASSIUM 100 MILLIGRAM(S): 100 TABLET, FILM COATED ORAL at 14:21

## 2019-05-07 RX ADMIN — Medication 650 MILLIGRAM(S): at 14:50

## 2019-05-07 RX ADMIN — ENOXAPARIN SODIUM 40 MILLIGRAM(S): 100 INJECTION SUBCUTANEOUS at 14:21

## 2019-05-07 RX ADMIN — Medication 25 MILLIGRAM(S): at 11:29

## 2019-05-07 RX ADMIN — Medication 40 MILLIEQUIVALENT(S): at 11:31

## 2019-05-07 RX ADMIN — LOSARTAN POTASSIUM 100 MILLIGRAM(S): 100 TABLET, FILM COATED ORAL at 06:13

## 2019-05-07 NOTE — PROGRESS NOTE ADULT - SUBJECTIVE AND OBJECTIVE BOX
PRE-OP NOTE    Pre-Op Diagnosis:  OM left third digit  Planned Procedure: amputation left third digit with flap coverage left third digit   Scheduled Date / Time:  May 7, 2019 0730  Indication:  OM left third digit   Labs:                       14.8   9.16  )-----------( 306      ( 05 May 2019 17:11 )             43.2   05-05    140  |  103  |  22  ----------------------------<  96  3.8   |  27  |  0.82    Ca    9.0      05 May 2019 17:11    TPro  7.6  /  Alb  4.0  /  TBili  0.5  /  DBili  x   /  AST  24  /  ALT  27  /  AlkPhos  87  05-05  LIVER FUNCTIONS - ( 05 May 2019 17:11 )  Alb: 4.0 g/dL / Pro: 7.6 g/dL / ALK PHOS: 87 U/L / ALT: 27 U/L DA / AST: 24 U/L / GGT: x           Vitals: Vital Signs Last 24 Hrs  T(C): 36.4 (07 May 2019 05:26), Max: 37.1 (06 May 2019 13:03)  T(F): 97.5 (07 May 2019 05:26), Max: 98.7 (06 May 2019 13:03)  HR: 60 (07 May 2019 05:26) (53 - 68)  BP: 168/94 (07 May 2019 05:26) (153/83 - 168/94)  BP(mean): --  RR: 18 (07 May 2019 05:26) (14 - 18)  SpO2: 97% (07 May 2019 05:26) (96% - 98%)  PE:   Official CXR reading: (On Chart)  Official EKG reading: (On Chart)  Type and Cross/Screen:   NPO after MN  Antibiotics:  as per ID  Anesthesia evaluation (on chart)  Operative Consent (on chart)       Patient  had an opportunity to have all questions asked and answered Patient is aware of all risks, benefits, alternatives and recuperative period involved with the planned surgical procedure.  No assurances or guarantees were given to the patient.  Pt wants to be discharged after procedure. He understands that a more aggressive amputation will be required to permit discharge and the patient consents to this treatment plan being fully aware of all surgical options.

## 2019-05-07 NOTE — PROGRESS NOTE ADULT - PROBLEM SELECTOR PLAN 1
Pt to OR for amputation of the left third digit with flap coverage  Pt wants to be discharged from hospital s/p procedure and be followed as an outpatient  Pt is to be discharged on oral antibiotics  Pt is to be discharged with surgical shoe and crutches NWB left foot  Pt is to be followed for the complete post operative course in my private practice  Pt was made aware that I recommend that he stay int  hospital, pt refuses being fully informed and made aware of risks, benefits and alternatives

## 2019-05-07 NOTE — DISCHARGE NOTE PROVIDER - NSDCCPCAREPLAN_GEN_ALL_CORE_FT
PRINCIPAL DISCHARGE DIAGNOSIS  Diagnosis: Osteomyelitis  Assessment and Plan of Treatment: continue Doxycycline for 7 days.   non PRINCIPAL DISCHARGE DIAGNOSIS  Diagnosis: Osteomyelitis  Assessment and Plan of Treatment: continue Doxycycline for 7 days.   Surgical shoe at all times  no weight bearing on left foot  f/u with Dr Hewitt as insructed.

## 2019-05-07 NOTE — DISCHARGE NOTE NURSING/CASE MANAGEMENT/SOCIAL WORK - NSDCDPATPORTLINK_GEN_ALL_CORE
You can access the [facility], offered by [facility], by registering with the following website: [email or web address]

## 2019-05-07 NOTE — DISCHARGE NOTE PROVIDER - CARE PROVIDER_API CALL
Romeo Hewitt (DPM)  Podiatric Medicine and Surgery  [address] [place] [address]  Phone: [phone]  Fax: [phone]  Follow Up Time:     [place] diallo  Phone: (   )    -  Fax: (   )    -  Follow Up Time:

## 2019-05-07 NOTE — PROGRESS NOTE ADULT - SUBJECTIVE AND OBJECTIVE BOX
RANDY CANALES is a 63yMale , patient examined and chart reviewed.      INTERVAL HPI/ OVERNIGHT EVENTS:   Afebrile. Sp Left 3rd toe amputation today.    PAST MEDICAL & SURGICAL HISTORY:  Dyslipidemia  HTN (hypertension)  H/O right knee surgery  History of ankle surgery  S/P lumbar fusion  S/P cervical spinal fusion    For details regarding the patient's social history, family history, and other miscellaneous elements, please refer the initial infectious diseases consultation and/or the admitting history and physical examination for this admission.    ROS:  CONSTITUTIONAL:  Negative fever or chills, feels well, good appetite  EYES:  Negative  blurry vision or double vision  CARDIOVASCULAR:  Negative for chest pain or palpitations  RESPIRATORY:  Negative for cough, wheezing, or SOB   GASTROINTESTINAL:  Negative for nausea, vomiting, diarrhea, constipation, or abdominal pain  GENITOURINARY:  Negative frequency, urgency or dysuria  NEUROLOGIC:  No headache, confusion, dizziness, lightheadedness  All other systems were reviewed and are negative       Current inpatient medications :    ANTIBIOTICS/RELEVANT:  doxycycline hyclate Capsule 100 milliGRAM(s) Oral every 12 hours    acetaminophen   Tablet .. 650 milliGRAM(s) Oral every 6 hours PRN  atorvastatin 20 milliGRAM(s) Oral at bedtime  enoxaparin Injectable 40 milliGRAM(s) SubCutaneous daily  losartan 100 milliGRAM(s) Oral daily  melatonin 3 milliGRAM(s) Oral at bedtime  metoprolol succinate ER 25 milliGRAM(s) Oral daily  oxyCODONE    IR 5 milliGRAM(s) Oral every 6 hours PRN      Objective:     @ 07: @ 07:00  --------------------------------------------------------  IN: 3180 mL / OUT: 1350 mL / NET: 1830 mL     @ 07: @ 16:38  --------------------------------------------------------  IN: 940 mL / OUT: 1300 mL / NET: -360 mL      T(C): 36.9 (19 @ 13:30), Max: 36.9 (19 @ 13:30)  HR: 105 (19 @ 13:30) (53 - 105)  BP: 158/95 (19 @ 13:30) (124/76 - 168/94)  RR: 16 (19 @ 13:30) (12 - 18)  SpO2: 100% (19 @ 13:30) (93% - 100%)    Physical Exam:  General: no acute distress  Eyes: sclera anicteric, pupils equal and reactive to light  ENMT: buccal mucosa moist, pharynx not injected  Neck: supple, trachea midline  Lungs: clear, no wheeze/rhonchi  Cardiovascular: regular rate and rhythm, S1 S2  Abdomen: soft, nontender, no organomegaly present, bowel sounds normal  Neurological: alert and oriented x3, Cranial Nerves II-XII grossly intact  Skin: no increased ecchymosis/petechiae/purpura  Lymph Nodes: no palpable cervical/supraclavicular lymph nodes enlargements  Extremities: Left foot drsg c/d/i      LABS:                          15.5   8.48  )-----------( 288      ( 07 May 2019 07:50 )             46.0           141  |  104  |  13  ----------------------------<  98  3.4<L>   |  32<H>  |  0.82    Ca    9.2      07 May 2019 07:50    TPro  7.6  /  Alb  4.0  /  TBili  0.5  /  DBili  x   /  AST  24  /  ALT  27  /  AlkPhos  87        PT/INR - ( 05 May 2019 17:11 )   PT: 15.3 sec;   INR: 1.39 ratio         PTT - ( 05 May 2019 17:11 )  PTT:33.9 sec  Urinalysis Basic - ( 05 May 2019 17:11 )    Color: Yellow / Appearance: Clear / S.015 / pH: x  Gluc: x / Ketone: Negative  / Bili: Negative / Urobili: Negative mg/dL   Blood: x / Protein: Negative mg/dL / Nitrite: Negative   Leuk Esterase: Negative / RBC: 0-2 /HPF / WBC 0-2   Sq Epi: x / Non Sq Epi: x / Bacteria: x    Vancomycin Level, Trough: 2.5 ug/mL ( @ 07:50)      MICROBIOLOGY:    Culture - Blood (collected 05 May 2019 21:37)  Source: .Blood  Preliminary Report (06 May 2019 22:00):    No growth to date.    Culture - Blood (collected 05 May 2019 21:37)  Source: .Blood  Preliminary Report (06 May 2019 22:00):    No growth to date.      RADIOLOGY & ADDITIONAL STUDIES:    EXAM:  FOOT COMPLETE LEFT (MIN 3 VIEWS)                          PROCEDURE DATE:  2019      INTERPRETATION:  Clinical history: 63-year-old male, osteomyelitis third   toe.    Three views without comparison were obtained, lucency in the tuft of the   third digit with a 3 mm lucency at the tip noted, findings are suspicious   for cellulitis/osteomyelitis. Remaining osseous and soft tissues intact.    Impression    Cortical and soft tissue lucency in the distal third digit suspicious for   cellulitis/osteomyelitis. Follow-up MRI can be ordered as clinically   indicated    Assessment :  62YO M with PMHx of HTN, dyslipidemia, s/p cervical and lumbar fusion, left ankle right knee and shoulder surgery admitted with Left third toe osteomyelitis sp ltoe amputation.  Overall doing well.     Plan :   Doxycycline 100mg po bid x 7 days  Cleared from podiatry standpoint for discharge  Fu podiatry outpt    D/w Hospitalist      Continue with present regiment.  Appropriate use of antibiotics and adverse effects reviewed.      I have discussed the above plan of care with patient/ family in detail. They expressed understanding of the  treatment plan . Risks, benefits and alternatives discussed in detail. I have asked if they have any questions or concerns and appropriately addressed them to the best of my ability .    > 35 minutes were spent in direct patient care reviewing notes, medications ,labs data/ imaging , discussion with multidisciplinary team.    Thank you for allowing me to participate in care of your patient .    Paola Marrero MD  Infectious Disease  [phone]

## 2019-05-07 NOTE — DISCHARGE NOTE PROVIDER - HOSPITAL COURSE
64 y/o male with PMHx of HTN, dyslipidemia, s/p cervical and lumbar fusion, left ankle right knee and shoulder surgery  presents to the ED c/o worsening Left third toe infection with discoloration/blackening of toe for a few weeks. Outpatient MRI showed osteomyelitis. Patient sent by podiatrist Dr. Hewitt for IV abx.  Denies fever, chills, headache, dizziness, chest pain.  Patient cleared for surgery.      on 5/7/19 he had an amputation of left third digit with flap coverage by Dr Hewitt.         PHYSICAL EXAM:    Vital Signs Last 24 Hrs    T(C): 36.9 (07 May 2019 13:30), Max: 36.9 (07 May 2019 13:30)    T(F): 98.4 (07 May 2019 13:30), Max: 98.4 (07 May 2019 13:30)    HR: 105 (07 May 2019 13:30) (53 - 105)    BP: 158/95 (07 May 2019 13:30) (124/76 - 168/94)    BP(mean): --    RR: 16 (07 May 2019 13:30) (12 - 18)    SpO2: 100% (07 May 2019 13:30) (93% - 100%)        GENERAL: NAD, well-groomed, well-developed    HEAD:  Atraumatic, Normocephalic    EYES: conjunctiva and sclera clear    ENMT: Moist mucous membranes    NECK: Supple, No JVD    NERVOUS SYSTEM:  Alert & Oriented X3, Good concentration; Moving all extremities.    CHEST/LUNG: Clear to auscultation bilaterally;     HEART: Regular rate and rhythm;     ABDOMEN: Soft, Nontender, Nondistended; Bowel sounds present    EXTREMITIES:  2+ Peripheral Pulses, dressing in place left foot, boot in place.         Doing well post Surgery. Seen by PT.      Cleared by all specialties for dc home. 64 y/o male with PMHx of HTN, dyslipidemia, s/p cervical and lumbar fusion, left ankle right knee and shoulder surgery  presents to the ED c/o worsening Left third toe infection with discoloration/blackening of toe for a few weeks. Outpatient MRI showed osteomyelitis. Patient sent by podiatrist Dr. Hewitt for IV abx.  Denies fever, chills, headache, dizziness, chest pain.  Patient cleared for surgery.      on 5/7/19 he had an amputation of left third digit with flap coverage by Dr Hewitt.         PHYSICAL EXAM:    Vital Signs Last 24 Hrs    T(C): 36.9 (07 May 2019 13:30), Max: 36.9 (07 May 2019 13:30)    T(F): 98.4 (07 May 2019 13:30), Max: 98.4 (07 May 2019 13:30)    HR: 105 (07 May 2019 13:30) (53 - 105)    BP: 158/95 (07 May 2019 13:30) (124/76 - 168/94)    BP(mean): --    RR: 16 (07 May 2019 13:30) (12 - 18)    SpO2: 100% (07 May 2019 13:30) (93% - 100%)        GENERAL: NAD, well-groomed, well-developed    HEAD:  Atraumatic, Normocephalic    EYES: conjunctiva and sclera clear    ENMT: Moist mucous membranes    NECK: Supple, No JVD    NERVOUS SYSTEM:  Alert & Oriented X3, Good concentration; Moving all extremities.    CHEST/LUNG: Clear to auscultation bilaterally;     HEART: Regular rate and rhythm;     ABDOMEN: Soft, Nontender, Nondistended; Bowel sounds present    EXTREMITIES:  2+ Peripheral Pulses, dressing in place left foot, boot in place.         Doing well post Surgery. Seen by PT.      Cleared by all specialties, including Podiatry, for dc home.        PMD dr Bassett notified.        dc time with patient and coordination of care with other providers about 45 minutes.

## 2019-05-07 NOTE — PHYSICAL THERAPY INITIAL EVALUATION ADULT - ADDITIONAL COMMENTS
Pt was previously independent in all ambulation, transfers, ADLs, and work. Pt lives in a house with his wife and son, there are no steps to enter house. Pt states he will stay on the first floor so will have no stairs inside. Pt owns a rolling walker and a knee scooter that he can use.

## 2019-05-08 LAB
NIGHT BLUE STAIN TISS: SIGNIFICANT CHANGE UP
NIGHT BLUE STAIN TISS: SIGNIFICANT CHANGE UP

## 2019-05-10 LAB
CULTURE RESULTS: SIGNIFICANT CHANGE UP
CULTURE RESULTS: SIGNIFICANT CHANGE UP
SPECIMEN SOURCE: SIGNIFICANT CHANGE UP
SPECIMEN SOURCE: SIGNIFICANT CHANGE UP

## 2019-05-11 LAB
-  AMPICILLIN: SIGNIFICANT CHANGE UP
-  AMPICILLIN: SIGNIFICANT CHANGE UP
-  TETRACYCLINE: SIGNIFICANT CHANGE UP
-  TETRACYCLINE: SIGNIFICANT CHANGE UP
-  VANCOMYCIN: SIGNIFICANT CHANGE UP
-  VANCOMYCIN: SIGNIFICANT CHANGE UP
METHOD TYPE: SIGNIFICANT CHANGE UP
METHOD TYPE: SIGNIFICANT CHANGE UP

## 2019-05-12 LAB
CULTURE RESULTS: SIGNIFICANT CHANGE UP
ORGANISM # SPEC MICROSCOPIC CNT: SIGNIFICANT CHANGE UP
SPECIMEN SOURCE: SIGNIFICANT CHANGE UP

## 2019-06-03 NOTE — DIETITIAN INITIAL EVALUATION ADULT. - NUTRITION INTERVENTION
Pt's daughter contacted [facility] requesting refill of [place] prescription. Refill sent to pharmacist on file.   
Meals and Snack

## 2019-06-06 LAB
CULTURE RESULTS: SIGNIFICANT CHANGE UP
SPECIMEN SOURCE: SIGNIFICANT CHANGE UP

## 2019-06-26 LAB
CULTURE RESULTS: SIGNIFICANT CHANGE UP
SPECIMEN SOURCE: SIGNIFICANT CHANGE UP

## 2020-01-30 PROBLEM — E78.5 HYPERLIPIDEMIA, UNSPECIFIED: Chronic | Status: ACTIVE | Noted: 2019-05-05

## 2020-01-30 PROBLEM — I10 ESSENTIAL (PRIMARY) HYPERTENSION: Chronic | Status: ACTIVE | Noted: 2019-05-05

## 2020-08-23 NOTE — PATIENT PROFILE ADULT - BRADEN SCORE
NURSE NOTES:

Patient was given partial bed bath, gown and linen changed. Pt tolerated well withotu 
discomforts. BP within normal limits too. Continue to monitor the patient. 20

## 2023-10-18 ENCOUNTER — APPOINTMENT (OUTPATIENT)
Dept: ORTHOPEDIC SURGERY | Facility: CLINIC | Age: 68
End: 2023-10-18
Payer: COMMERCIAL

## 2023-10-18 VITALS
SYSTOLIC BLOOD PRESSURE: 178 MMHG | DIASTOLIC BLOOD PRESSURE: 81 MMHG | HEIGHT: 72 IN | TEMPERATURE: 98.2 F | WEIGHT: 240 LBS | HEART RATE: 59 BPM | BODY MASS INDEX: 32.51 KG/M2 | OXYGEN SATURATION: 98 %

## 2023-10-18 DIAGNOSIS — M54.16 RADICULOPATHY, LUMBAR REGION: ICD-10-CM

## 2023-10-18 DIAGNOSIS — M43.16 SPONDYLOLISTHESIS, LUMBAR REGION: ICD-10-CM

## 2023-10-18 DIAGNOSIS — M51.26 OTHER INTERVERTEBRAL DISC DISPLACEMENT, LUMBAR REGION: ICD-10-CM

## 2023-10-18 DIAGNOSIS — Z82.49 FAMILY HISTORY OF ISCHEMIC HEART DISEASE AND OTHER DISEASES OF THE CIRCULATORY SYSTEM: ICD-10-CM

## 2023-10-18 DIAGNOSIS — M48.07 SPINAL STENOSIS, LUMBOSACRAL REGION: ICD-10-CM

## 2023-10-18 PROCEDURE — 99204 OFFICE O/P NEW MOD 45 MIN: CPT

## 2023-10-18 PROCEDURE — 72082 X-RAY EXAM ENTIRE SPI 2/3 VW: CPT

## 2023-10-18 RX ORDER — HYDROCHLOROTHIAZIDE 12.5 MG/1
12.5 CAPSULE ORAL
Refills: 0 | Status: ACTIVE | COMMUNITY

## 2023-10-18 RX ORDER — DUTASTERIDE 0.5 MG/1
0.5 CAPSULE, LIQUID FILLED ORAL
Refills: 0 | Status: ACTIVE | COMMUNITY

## 2023-10-18 RX ORDER — ATORVASTATIN CALCIUM 20 MG/1
20 TABLET, FILM COATED ORAL
Refills: 0 | Status: ACTIVE | COMMUNITY

## 2023-10-18 RX ORDER — PREGABALIN 75 MG/1
75 CAPSULE ORAL
Refills: 0 | Status: ACTIVE | COMMUNITY

## 2023-10-18 RX ORDER — METOPROLOL TARTRATE 50 MG/1
50 TABLET, FILM COATED ORAL
Refills: 0 | Status: ACTIVE | COMMUNITY

## 2023-10-18 RX ORDER — LOSARTAN POTASSIUM 100 MG/1
100 TABLET, FILM COATED ORAL
Refills: 0 | Status: ACTIVE | COMMUNITY

## 2023-10-18 RX ORDER — MELOXICAM 15 MG/1
15 TABLET ORAL
Qty: 30 | Refills: 0 | Status: ACTIVE | COMMUNITY
Start: 2023-10-18 | End: 1900-01-01

## 2023-12-06 ENCOUNTER — APPOINTMENT (OUTPATIENT)
Dept: ORTHOPEDIC SURGERY | Facility: CLINIC | Age: 68
End: 2023-12-06

## 2023-12-11 ENCOUNTER — APPOINTMENT (OUTPATIENT)
Dept: ORTHOPEDIC SURGERY | Facility: CLINIC | Age: 68
End: 2023-12-11

## 2024-02-26 ENCOUNTER — APPOINTMENT (OUTPATIENT)
Dept: ORTHOPEDIC SURGERY | Facility: CLINIC | Age: 69
End: 2024-02-26
Payer: COMMERCIAL

## 2024-02-26 DIAGNOSIS — M76.822 POSTERIOR TIBIAL TENDINITIS, LEFT LEG: ICD-10-CM

## 2024-02-26 DIAGNOSIS — Z86.39 PERSONAL HISTORY OF OTHER ENDOCRINE, NUTRITIONAL AND METABOLIC DISEASE: ICD-10-CM

## 2024-02-26 DIAGNOSIS — Z86.79 PERSONAL HISTORY OF OTHER DISEASES OF THE CIRCULATORY SYSTEM: ICD-10-CM

## 2024-02-26 DIAGNOSIS — Z85.46 PERSONAL HISTORY OF MALIGNANT NEOPLASM OF PROSTATE: ICD-10-CM

## 2024-02-26 DIAGNOSIS — Z00.00 ENCOUNTER FOR GENERAL ADULT MEDICAL EXAMINATION W/OUT ABNORMAL FINDINGS: ICD-10-CM

## 2024-02-26 PROCEDURE — 73610 X-RAY EXAM OF ANKLE: CPT | Mod: LT

## 2024-02-26 PROCEDURE — 99203 OFFICE O/P NEW LOW 30 MIN: CPT | Mod: 25

## 2024-02-26 NOTE — DISCUSSION/SUMMARY
[de-identified] : Discussed treatment options with patient.  Recommend custom fabricated hinged AFO with lining and varus/valgus control; rx and NBB info provided.  The patient is ambulatory and requires stabilization at the ankle to continue safe ambulation. The patient requires a custom brace because they cannot fit into a prefabricated brace due to the limb size and shape. Also, the condition necessitating the orthosis is expected to be of longstanding duration. There is also a need to control the ankle in multiple planes.  He will follow up after wearing custom brace for about one month.   NSAIDS, ice, activity modification discussed.

## 2024-02-26 NOTE — HISTORY OF PRESENT ILLNESS
[3] : 3 [Dull/Aching] : dull/aching [de-identified] : RANDY CANALES a 68 year old male here for evaluation of his. Patient states he has swelling in the ankle for 1 year. He reports having a toe amputated in 2000 for infection and had surgery on the medial hindfoot over 10 years ago (by podiatry) but he does not know what was done. He wears custom orthotics.  No other recent treatment.  No numbness/tingling.   [] : no

## 2024-02-26 NOTE — PHYSICAL EXAM
[NL (20)] : dorsiflexion 20 degrees [NL (40)] : plantar flexion 40 degrees [2___] : inversion 2[unfilled]/5 [5___] : eversion 5[unfilled]/5 [2+] : posterior tibialis pulse: 2+ [Normal] : saphenous nerve sensation normal [] : patient ambulates without assistive device [There are no fractures, subluxations or dislocations. No significant abnormalities are seen] : There are no fractures, subluxations or dislocations. No significant abnormalities are seen [Weight -] : weightbearing [Left] : left foot [FreeTextEntry3] : Pes planovalgus worse on LT compared to RT. There is forefoot abduction. No 3rd toe.  [de-identified] : Missing third toe, pes planus [de-identified] : Cannot do single heel rise on either side.  [de-identified] : inversion 5 degrees

## 2024-06-07 ENCOUNTER — APPOINTMENT (OUTPATIENT)
Dept: ORTHOPEDIC SURGERY | Facility: CLINIC | Age: 69
End: 2024-06-07

## 2024-10-18 ENCOUNTER — APPOINTMENT (OUTPATIENT)
Dept: ORTHOPEDIC SURGERY | Facility: CLINIC | Age: 69
End: 2024-10-18
Payer: COMMERCIAL

## 2024-10-18 DIAGNOSIS — L97.429 NON-PRESSURE CHRONIC ULCER OF LEFT HEEL AND MIDFOOT WITH UNSPECIFIED SEVERITY: ICD-10-CM

## 2024-10-18 DIAGNOSIS — M76.822 POSTERIOR TIBIAL TENDINITIS, LEFT LEG: ICD-10-CM

## 2024-10-18 PROCEDURE — 99213 OFFICE O/P EST LOW 20 MIN: CPT

## 2024-10-25 ENCOUNTER — APPOINTMENT (OUTPATIENT)
Dept: WOUND CARE | Facility: HOSPITAL | Age: 69
End: 2024-10-25

## 2025-05-15 NOTE — PRE-OP CHECKLIST - WEIGHT IN LBS
----- Message from DUKE Rodriges sent at 5/15/2025  7:35 AM CDT -----  Total cholesterol slightly elevated. Continue atorvastatin as prescribed.  UA with a small amount of leukocytes.  Will await for urine culture results.  Thyroid function, CMP, vitamin D, A1c, and blood counts normal.  Continue medications as prescribed.  Inform patient I did put in an order for Cologuard as the previous was placed in  and .    [id number]